# Patient Record
Sex: MALE | URBAN - METROPOLITAN AREA
[De-identification: names, ages, dates, MRNs, and addresses within clinical notes are randomized per-mention and may not be internally consistent; named-entity substitution may affect disease eponyms.]

---

## 2022-11-01 ENCOUNTER — HOME HEALTH ADMISSION (OUTPATIENT)
Dept: HOME HEALTH SERVICES | Facility: HOME HEALTH | Age: 82
End: 2022-11-01

## 2022-11-02 ENCOUNTER — HOME CARE VISIT (OUTPATIENT)
Dept: SCHEDULING | Facility: HOME HEALTH | Age: 82
End: 2022-11-02

## 2022-11-02 VITALS
SYSTOLIC BLOOD PRESSURE: 138 MMHG | DIASTOLIC BLOOD PRESSURE: 80 MMHG | RESPIRATION RATE: 16 BRPM | OXYGEN SATURATION: 98 % | TEMPERATURE: 98.2 F | HEART RATE: 64 BPM

## 2022-11-02 PROCEDURE — 0221000100 HH NO PAY CLAIM PROCEDURE

## 2022-11-02 PROCEDURE — G0299 HHS/HOSPICE OF RN EA 15 MIN: HCPCS

## 2022-11-02 ASSESSMENT — ENCOUNTER SYMPTOMS
PAIN LOCATION - PAIN QUALITY: ACHES
DYSPNEA ACTIVITY LEVEL: AFTER AMBULATING MORE THAN 20 FT

## 2022-11-02 NOTE — HOME HEALTH
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 PMH: CVA, HTN, HLD, Hip Sx, Knee SX, OA, DM II, Kidney stones. Intervention: [place] in [place], high functioning/independent, HX CVA: GUERRA x4, DOUG, speech clear, mild weakness/unsteady gait, uses cane/walker for ambualtion. VS/SATS WNL, lungs clear, edema absent. Checks BP twice daily -125 range. DM II: Checks BS weekly in low 100's range, treated with oral medication and diet. Reports that he has been trying to heal a chronic pressure wound since his CVA, as noted above. Wound care per POC, tolerated well, instructed on pressure injury prevention/care, off-loading, moisture barrier, diet to promote healing, will get him a gel cushion, taught back understanding. Medications reconciled, he manages independently. Instructed on [facility] when to call/911, high risk medication Metformin/hypoglycemia/hyperglycemia/sick day/diet/DM foot care, medications/disease management, wound care/pressure injury prevention, safety/fall/infection precautions and plan of care, taught back understanding. Goal: Patient will be safe at home free from falls/injury/infection, wound healed, free from complications of DM.

## 2022-11-03 ENCOUNTER — HOME CARE VISIT (OUTPATIENT)
Dept: HOME HEALTH SERVICES | Facility: HOME HEALTH | Age: 82
End: 2022-11-03

## 2022-11-08 ENCOUNTER — HOME CARE VISIT (OUTPATIENT)
Dept: SCHEDULING | Facility: HOME HEALTH | Age: 82
End: 2022-11-08

## 2022-11-08 VITALS
DIASTOLIC BLOOD PRESSURE: 63 MMHG | RESPIRATION RATE: 16 BRPM | OXYGEN SATURATION: 95 % | HEART RATE: 68 BPM | SYSTOLIC BLOOD PRESSURE: 111 MMHG | TEMPERATURE: 98 F

## 2022-11-08 PROCEDURE — G0300 HHS/HOSPICE OF LPN EA 15 MIN: HCPCS

## 2022-11-08 ASSESSMENT — ENCOUNTER SYMPTOMS
BOWEL INCONTINENCE: 1
HEMOPTYSIS: 0

## 2022-11-11 ENCOUNTER — HOME CARE VISIT (OUTPATIENT)
Dept: SCHEDULING | Facility: HOME HEALTH | Age: 82
End: 2022-11-11

## 2022-11-11 VITALS
OXYGEN SATURATION: 95 % | RESPIRATION RATE: 16 BRPM | SYSTOLIC BLOOD PRESSURE: 118 MMHG | HEART RATE: 77 BPM | TEMPERATURE: 97.9 F | DIASTOLIC BLOOD PRESSURE: 60 MMHG

## 2022-11-11 PROCEDURE — G0300 HHS/HOSPICE OF LPN EA 15 MIN: HCPCS

## 2022-11-11 ASSESSMENT — ENCOUNTER SYMPTOMS
BOWEL INCONTINENCE: 1
HEMOPTYSIS: 0

## 2022-11-11 NOTE — HOME HEALTH
Patient with DM, wounds, mobility and cognitive impairment   wound care provided per md order  Caregiver involvement: resides in custodial   Patient education provided this visit: SN educated patient and patient's caregiver on hyper/hypoglycemia signs and symptoms. Patient and patient caregiver verbalizes understanding via the teach back method.    Progress toward goals: progressing well client alert and oriented times 3, vital signs were within normal limits, client denies pain at this time, asymptomatic dm, new owund noted to left great toe patient to be seen by podiatrist today awaiting new orders, no edema noted, lung sounds were clear no cough or shortness of breath was noted, fall precautions were maintained   Plan for next visit: wound care, follow up on new wound care orders  discharge planning was discussed with the patient/ patient's caregiver: DC when goals met

## 2022-11-11 NOTE — Clinical Note
Patient sustained a new injury to left great toe  patient expressed bumping toe, and nail ripping off, skilled nurse observed and proivided first aid to toe   patient is to be seen today by podiatry, will call/fax office with new orders

## 2022-11-15 ENCOUNTER — HOME CARE VISIT (OUTPATIENT)
Dept: SCHEDULING | Facility: HOME HEALTH | Age: 82
End: 2022-11-15

## 2022-11-15 VITALS
TEMPERATURE: 97.7 F | OXYGEN SATURATION: 97 % | HEART RATE: 54 BPM | SYSTOLIC BLOOD PRESSURE: 122 MMHG | RESPIRATION RATE: 18 BRPM | DIASTOLIC BLOOD PRESSURE: 72 MMHG

## 2022-11-15 PROCEDURE — G0299 HHS/HOSPICE OF RN EA 15 MIN: HCPCS

## 2022-11-15 ASSESSMENT — ENCOUNTER SYMPTOMS: DYSPNEA ACTIVITY LEVEL: AFTER AMBULATING MORE THAN 20 FT

## 2022-11-15 NOTE — HOME HEALTH
Problem: Pressure injury to B/L buttocks healed, CDI/MANSI. New left great toe wound 1.5x1x0.1, pink, moderate SS drainage. He saw Podiatry who removed toe nail. Intervention: C SN for wound care per POC, tolerated well, instructed on wound care/diet/DM/infection prevention/pressure injury prevention taught back understanding. DM managed by diet/medication, not checking BS regularly, reinforced BS log/checks, reports BS has been <150 when he checks it once or twice a month. Instructed on medications/disease management, safety/fall/pressure injury/infection/DM precautions, DM, and plan of care, taught back understanding, continue to reinforce. Goal: Patient will be safe at home free from falls/injury/infection, compliant with DM management, wounds healed.

## 2022-11-18 ENCOUNTER — HOME CARE VISIT (OUTPATIENT)
Dept: SCHEDULING | Facility: HOME HEALTH | Age: 82
End: 2022-11-18

## 2022-11-18 VITALS
DIASTOLIC BLOOD PRESSURE: 76 MMHG | TEMPERATURE: 97.8 F | RESPIRATION RATE: 17 BRPM | OXYGEN SATURATION: 97 % | HEART RATE: 68 BPM | SYSTOLIC BLOOD PRESSURE: 126 MMHG

## 2022-11-18 PROCEDURE — G0299 HHS/HOSPICE OF RN EA 15 MIN: HCPCS

## 2022-11-18 ASSESSMENT — ENCOUNTER SYMPTOMS: DYSPNEA ACTIVITY LEVEL: AFTER AMBULATING MORE THAN 20 FT

## 2022-11-18 NOTE — HOME HEALTH
Problem: LLE open wound with routine healing, 1.5x1.5x0.1, pink, small SS drainage. Intervention: HHC SN wound care per POC, tolerated well, instructed on wound care/infection prevention/diet to promote healing, taught back understanding. VS/SATS WNL, at baseline mental/physical capacity. Instructed on HHC/plan of care, safety/fall/pressure injury/bleeding precautions, wound care, medications/disease management, taught back understanding. Goal: Patient will be safe at home free from falls/injury/infection, wound healed, free from complications of DM, check BS.

## 2022-11-22 ENCOUNTER — HOME CARE VISIT (OUTPATIENT)
Dept: SCHEDULING | Facility: HOME HEALTH | Age: 82
End: 2022-11-22

## 2022-11-22 VITALS
RESPIRATION RATE: 16 BRPM | SYSTOLIC BLOOD PRESSURE: 126 MMHG | TEMPERATURE: 98 F | HEART RATE: 80 BPM | OXYGEN SATURATION: 96 % | DIASTOLIC BLOOD PRESSURE: 74 MMHG

## 2022-11-22 PROCEDURE — G0300 HHS/HOSPICE OF LPN EA 15 MIN: HCPCS

## 2022-11-22 ASSESSMENT — ENCOUNTER SYMPTOMS
BOWEL INCONTINENCE: 1
HEMOPTYSIS: 0

## 2022-11-22 NOTE — HOME HEALTH
Patient with wound, edema, mobility and cognitive impairment   wound care provided per md order, patient tolerated well   Caregiver involvement: resides in [place]   Patient education provided this visit: SN educated patient and patient's caregiver on signs and symptoms of infection and oral medications. Patient and patient caregiver verbalizes understanding via the teach back method.    Progress toward goals: progressing well client alert and oriented, vital signs were within normal limits, client denies pain at this time, wound care provided per md order, patient tolerated well, measurments obtained, small serous drainage noted, no signs or sympotms of infection noted,  no edema noted to bilateral lower extremities, lung sounds were clear no cough or shortness of breath noted, fall precautions maintained, asymptomatic diabetic   Plan for next visit: wound care  discharge planning was discussed with the patient/ patient's caregiver: DC when goals met

## 2022-11-25 ENCOUNTER — HOME CARE VISIT (OUTPATIENT)
Dept: SCHEDULING | Facility: HOME HEALTH | Age: 82
End: 2022-11-25

## 2022-11-25 PROCEDURE — G0299 HHS/HOSPICE OF RN EA 15 MIN: HCPCS

## 2022-11-28 ENCOUNTER — HOME CARE VISIT (OUTPATIENT)
Dept: SCHEDULING | Facility: HOME HEALTH | Age: 82
End: 2022-11-28

## 2022-11-28 VITALS
SYSTOLIC BLOOD PRESSURE: 101 MMHG | DIASTOLIC BLOOD PRESSURE: 60 MMHG | TEMPERATURE: 98 F | RESPIRATION RATE: 18 BRPM | HEART RATE: 56 BPM | OXYGEN SATURATION: 96 %

## 2022-11-28 VITALS
TEMPERATURE: 97.9 F | SYSTOLIC BLOOD PRESSURE: 119 MMHG | OXYGEN SATURATION: 97 % | DIASTOLIC BLOOD PRESSURE: 56 MMHG | HEART RATE: 66 BPM | RESPIRATION RATE: 16 BRPM

## 2022-11-28 PROCEDURE — G0300 HHS/HOSPICE OF LPN EA 15 MIN: HCPCS

## 2022-11-28 ASSESSMENT — ENCOUNTER SYMPTOMS
BOWEL INCONTINENCE: 1
HEMOPTYSIS: 0

## 2022-11-28 NOTE — HOME HEALTH
Patient with asymptomatic DM, wound, mobility and cognitive impairment  wound care was provided per md order   Caregiver involvement: resides in [facility]   Patient education provided this visit: SN educated patient and patient's caregiver on hypoglycemic medication uses and hypoglycemia sign and symptoms   Patient and patient caregiver verbalizes understanding via the teach back method.    Progress toward goals: progressing well client alert and oriented, client denies pain at this time, asymptomatic diabetic, wound care was provided to great toe per md order, no drainagenoted, no signs or sypmotms of infection noted, measuremnets obtained, no edmea noted, lung sounds were clear to auscultaion, no cough or shortness of breath noted, fall precautions were maintianed   Plan for next visit: wound care   discharge planning was discussed with the patient/ patient's caregiver: DC when goals met

## 2022-12-02 ENCOUNTER — HOME CARE VISIT (OUTPATIENT)
Dept: SCHEDULING | Facility: HOME HEALTH | Age: 82
End: 2022-12-02

## 2022-12-02 VITALS
SYSTOLIC BLOOD PRESSURE: 146 MMHG | OXYGEN SATURATION: 97 % | RESPIRATION RATE: 18 BRPM | DIASTOLIC BLOOD PRESSURE: 78 MMHG | HEART RATE: 50 BPM | TEMPERATURE: 97.7 F

## 2022-12-02 PROCEDURE — G0299 HHS/HOSPICE OF RN EA 15 MIN: HCPCS

## 2022-12-02 NOTE — HOME HEALTH
Patient doing well, wounds healed, to Buttocks and left great toe, CDI/MANSI, instructed on wound care/pressure injury prevention/infection prevention/DM/diet to promote healing, DM foot care, taught back understanding. Occasionally checks BS, encouraged to continue to monitor on more frequent basis, /has been WNL. DM managed with diet/medications, independently/without difficulty. VS/SATS WNL, no further HHC SN needs, HHC ODC. Continue routine care as directed by physician in [facility] setting, safety/fall/pressure injury/infection/DM precautions. Take medications as ordered, follow up with MD as directed, reoport BS <70 or >400. Goal: Patient will be safe at home free from falls/injury/infection, free from complications of DM. Patient discharged from [place] [id number] to [facility] with assistance.
